# Patient Record
Sex: FEMALE | Race: WHITE | NOT HISPANIC OR LATINO | URBAN - METROPOLITAN AREA
[De-identification: names, ages, dates, MRNs, and addresses within clinical notes are randomized per-mention and may not be internally consistent; named-entity substitution may affect disease eponyms.]

---

## 2017-03-09 ENCOUNTER — OFFICE VISIT - HEALTHEAST (OUTPATIENT)
Dept: FAMILY MEDICINE | Facility: CLINIC | Age: 37
End: 2017-03-09

## 2017-03-09 DIAGNOSIS — J02.9 SORE THROAT: ICD-10-CM

## 2017-03-09 DIAGNOSIS — J02.9 PHARYNGITIS: ICD-10-CM

## 2017-03-09 ASSESSMENT — MIFFLIN-ST. JEOR: SCORE: 1405.49

## 2017-03-10 ENCOUNTER — COMMUNICATION - HEALTHEAST (OUTPATIENT)
Dept: FAMILY MEDICINE | Facility: CLINIC | Age: 37
End: 2017-03-10

## 2017-04-26 ENCOUNTER — COMMUNICATION - HEALTHEAST (OUTPATIENT)
Dept: SCHEDULING | Facility: CLINIC | Age: 37
End: 2017-04-26

## 2018-02-25 ENCOUNTER — COMMUNICATION - HEALTHEAST (OUTPATIENT)
Dept: SCHEDULING | Facility: CLINIC | Age: 38
End: 2018-02-25

## 2018-03-23 ENCOUNTER — OFFICE VISIT - HEALTHEAST (OUTPATIENT)
Dept: FAMILY MEDICINE | Facility: CLINIC | Age: 38
End: 2018-03-23

## 2018-03-23 DIAGNOSIS — L30.9 DERMATITIS: ICD-10-CM

## 2018-03-23 DIAGNOSIS — Z97.3 WEARS CONTACT LENSES: ICD-10-CM

## 2018-03-23 DIAGNOSIS — H10.13 ALLERGIC CONJUNCTIVITIS OF BOTH EYES: ICD-10-CM

## 2018-05-23 ENCOUNTER — OFFICE VISIT - HEALTHEAST (OUTPATIENT)
Dept: FAMILY MEDICINE | Facility: CLINIC | Age: 38
End: 2018-05-23

## 2018-05-23 DIAGNOSIS — H57.89 IRRITATION OF BOTH EYES: ICD-10-CM

## 2018-05-23 DIAGNOSIS — J30.2 SEASONAL ALLERGIES: ICD-10-CM

## 2018-05-23 DIAGNOSIS — R21 FACIAL RASH: ICD-10-CM

## 2018-05-23 DIAGNOSIS — L30.9 ECZEMA: ICD-10-CM

## 2018-05-23 LAB
C REACTIVE PROTEIN LHE: <0.1 MG/DL (ref 0–0.8)
ERYTHROCYTE [SEDIMENTATION RATE] IN BLOOD BY WESTERGREN METHOD: 5 MM/HR (ref 0–20)

## 2018-05-23 RX ORDER — TRIAMCINOLONE ACETONIDE 1 MG/G
CREAM TOPICAL
Qty: 30 G | Refills: 0 | Status: SHIPPED | OUTPATIENT
Start: 2018-05-23

## 2018-05-23 RX ORDER — OLOPATADINE HYDROCHLORIDE 1 MG/ML
1 SOLUTION/ DROPS OPHTHALMIC 2 TIMES DAILY
Qty: 5 ML | Refills: 1 | Status: SHIPPED | OUTPATIENT
Start: 2018-05-23

## 2018-05-23 ASSESSMENT — MIFFLIN-ST. JEOR: SCORE: 1379.13

## 2018-05-24 LAB — ANA SER QL: 2.4 U

## 2018-05-29 LAB
DNA (DS) ANTIBODY - HISTORICAL: 17 IU
SM (SMITH AUTOANTIBODIES - HISTORICAL: 2 EU

## 2018-05-31 ENCOUNTER — OFFICE VISIT - HEALTHEAST (OUTPATIENT)
Dept: ALLERGY | Facility: CLINIC | Age: 38
End: 2018-05-31

## 2018-05-31 DIAGNOSIS — L30.9 DERMATITIS: ICD-10-CM

## 2018-05-31 RX ORDER — TACROLIMUS 1 MG/G
OINTMENT TOPICAL
Qty: 30 G | Refills: 1 | Status: SHIPPED | OUTPATIENT
Start: 2018-05-31

## 2018-05-31 RX ORDER — CETIRIZINE HYDROCHLORIDE 10 MG/1
10 TABLET ORAL DAILY
Qty: 60 TABLET | Refills: 2 | Status: SHIPPED | OUTPATIENT
Start: 2018-05-31

## 2018-05-31 ASSESSMENT — MIFFLIN-ST. JEOR: SCORE: 1377.15

## 2018-06-07 ENCOUNTER — OFFICE VISIT - HEALTHEAST (OUTPATIENT)
Dept: FAMILY MEDICINE | Facility: CLINIC | Age: 38
End: 2018-06-07

## 2018-06-07 DIAGNOSIS — Z48.02 VISIT FOR SUTURE REMOVAL: ICD-10-CM

## 2018-06-07 DIAGNOSIS — S61.211A LACERATION OF LEFT INDEX FINGER: ICD-10-CM

## 2018-06-07 ASSESSMENT — MIFFLIN-ST. JEOR: SCORE: 1388.2

## 2019-05-01 ENCOUNTER — COMMUNICATION - HEALTHEAST (OUTPATIENT)
Dept: FAMILY MEDICINE | Facility: CLINIC | Age: 39
End: 2019-05-01

## 2021-05-28 NOTE — TELEPHONE ENCOUNTER
Reason contacted:  To help the Pt schedule a PHY  Information relayed:  Pt states they have moved out of state and is no longer coming to [facility] for care. Dr. Leah Salcedo is removed off of Pt's PCP    Additional questions:  No  Further follow-up needed:  No  Okay to leave a detailed message:  Yes

## 2021-05-28 NOTE — TELEPHONE ENCOUNTER
Left message to call back for: Keri     Information to relay to patient: Please help pt schedule physical with PAP.

## 2021-05-30 VITALS — BODY MASS INDEX: 25.39 KG/M2 | WEIGHT: 158 LBS | HEIGHT: 66 IN

## 2021-06-01 VITALS — WEIGHT: 152.44 LBS | BODY MASS INDEX: 24.5 KG/M2 | HEIGHT: 66 IN

## 2021-06-01 VITALS — BODY MASS INDEX: 25.73 KG/M2 | WEIGHT: 157 LBS

## 2021-06-01 VITALS — HEIGHT: 66 IN | WEIGHT: 150 LBS | BODY MASS INDEX: 24.11 KG/M2

## 2021-06-01 VITALS — WEIGHT: 150.44 LBS | BODY MASS INDEX: 24.18 KG/M2 | HEIGHT: 66 IN

## 2021-06-09 NOTE — PROGRESS NOTES
Subjective:      Keri Castellanos is a 36 y.o. female who presents today for sore throat and general malaise for 1 day.  She said she was fine at work yesterday and started feeling sick last evening with sore throat and tiredness.  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  Also states she felt pressure in her right ear; denies pain and popping noises.  She has not taken anything OTC to help symptoms.  She has not found that anything makes the symptoms worse.  Denies fever, chills, nausea, vomiting, diarrhea.  Denies coughing, shortness of breath, wheezing, chest pain  Denies sinus pressure/pain    Reviewed past medical/surgical history, family history, social history, medications and updated chart below.     No Known Allergies  History reviewed. No pertinent past medical history.  History reviewed. No pertinent surgical history.  Social History     Social History     Marital status:      Spouse name: N/A     Number of children: N/A     Years of education: N/A     Occupational History     Not on file.     Social History Main Topics     Smoking status: Former Smoker     Smokeless tobacco: Not on file     Alcohol use Yes     Drug use: No     Sexual activity: Not on file     Other Topics Concern     Not on file     Social History Narrative     Family History   Problem Relation Age of Onset     Multiple sclerosis Mother      Lymphoma Father      non-Hodgkin's     Current Outpatient Prescriptions   Medication Sig Dispense Refill     amoxicillin (AMOXIL) 500 MG tablet Take 500 mg by mouth 3 (three) times a day. Take until gone.       cholecalciferol, vitamin D3, (VITAMIN D3) 5,000 unit Tab Take 1 tablet by mouth daily.       triamcinolone (KENALOG) 0.1 % cream Apply topically 2 (two) times a day. APPLY AND RUB IN A THIN FILM TO AFFECTED AREAS TWICE DAILY.(AM AND PM).       No current facility-administered medications for this visit.   "    Patient Active Problem List    Diagnosis Date Noted     Gynecologic Services Intrauterine Device (IUD) Insertion      Allergic Rhinitis      De Quervain's Tenosynovitis      Normal Routine History And Physical - Postpartum      Post-term Pregnancy      Insufficient Uterine Enlargement For Dates Of Pregnancy        Review of Systems   Constitutional: Negative.   HENT: Sore throat.   Eyes: Negative.   Respiratory: Negative.   Cardiovascular: Negative.   Gastrointestinal: Negative.   Endocrine: Negative.   Genitourinary: Negative.   Musculoskeletal: Negative.   Skin: Negative.   Allergic/Immunologic: Negative.   Neurological: Negative.   Hematological: Negative.   Psychiatric/Behavioral: Negative.     Objective:    Physical Exam   Visit Vitals     /70 (Patient Site: Left Arm, Patient Position: Sitting, Cuff Size: Adult Regular)     Pulse 78     Temp 97.9  F (36.6  C) (Oral)     Ht 5' 5.5\" (1.664 m)     Wt 158 lb (71.7 kg)     SpO2 98%     BMI 25.89 kg/m2       Constitutional: Alert and oriented x3, well nourished without any acute distress  HENT:   Right Ear: External ear normal.   Left Ear: External ear normal.   Nose: Nose normal.  Right turbinate erythematous  Mouth/Throat: Oropharynx is clear and moist.   Eyes: Conjunctivae and EOM are normal. Pupils are equal, round, and reactive to light. Right eye exhibits no discharge. Left eye exhibits no discharge.   Lymphadenopathy: Without palpable lymphadenopathy  Cardiovascular: Normal S1 and S2. Regular rate, rhythm and no murmur, rubs or gallops. Palpable distal pulses bilaterally.  Pulmonary/Chest: Normal effort. Lungs clear to auscultation in all lobes. Without wheezing, rhonchi or crackles.    Psychiatric: Normal mood and affect.         Assessment/Plan:    1. Pharyngitis  Afebrile-97.9 and oxygen saturations-98% on room air, LCTA  Rapid strep and influenza testing done today, negative. Symptoms for one day, I do suspect viral in nature.   Discussed home " supportive care and recommended rest, fluids/ warm fluids, humidification, saline nasal spray, throat lozenges, gargle with warm salt water.    Reviewed red flags that would warrant urgent evaluation. Patient verbalized understanding.  Discussed with patient to follow up if worsening symptoms, questions, or concerns.  Patient agreed and appeared pleased with plan.       - Rapid Strep A Screen-Throat  - Influenza A/B Rapid Test  - Group A Strep, RNA Direct Detection, Throat      Recent Results (from the past 24 hour(s))   Rapid Strep A Screen-Throat   Result Value Ref Range    Rapid Strep A Antigen No Group A Strep detected No Group A Strep detected   Influenza A/B Rapid Test   Result Value Ref Range    Influenza  A, Rapid Antigen No Influenza A antigen detected No Influenza A antigen detected    Influenza B, Rapid Antigen No Influenza B antigen detected No Influenza B antigen detected     Addendum:  I have seen and examined Keri KULDEEP Flanaganro with student Dulce Marr. I agree with the above note. I was present during the entirety of the office visit, completed the physical exam and charting with Dulce.     Lorena Culver, MARIAJOSE  3/9/2017

## 2021-06-16 PROBLEM — L30.9 ECZEMA: Status: ACTIVE | Noted: 2018-05-23

## 2021-06-16 NOTE — PROGRESS NOTES
"Subjective:      Patient ID: Keri Rajan is a 37 y.o. female.    Chief Complaint:    HPI  Keri Rajan is a 37 y.o. female who presents today complaining of return of her eye problem.  It is a contact lens wear.  She has had a long-standing history of irritation to her contacts.  He only wears her contacts intermittently and she has had 2 separate bouts of having irritation from her contacts.  She discontinue the contact lens wear and then it did resolve.  This time she started wearing her contact lenses 2 days ago.  Over the last day she said that a \"flareup\" started with causing trouble in the bilateral eyes.  She removed the contacts still having irritation and redness on the left eye but also is having difficulty with irritation, dry skin and reactivity with redness around the inferior orbit of the left and right eye as well as on the superior orbit of the right eye.  This time she has no visual disturbances signs or symptoms of corneal abrasion or irritation or foreign body into the eyes. However, they have been itchy    She did try 1 dose of Benadryl 25 mg over-the-counter yesterday without relief.  Says normally spontaneously resolved for the patient in the past 3 episodes that she has had of this.    She has not went to go see her ophthalmologist for reevaluation of the problem with contact lenses.    No past medical history on file.    No past surgical history on file.    Family History   Problem Relation Age of Onset     Multiple sclerosis Mother      Lymphoma Father      non-Hodgkin's       Social History   Substance Use Topics     Smoking status: Former Smoker     Smokeless tobacco: Never Used     Alcohol use Yes       Review of Systems  As above in HPI otherwise negative  Objective:     /60  Pulse 64  Temp 98.5  F (36.9  C) (Oral)   Resp 14  Wt 157 lb (71.2 kg)  SpO2 100%  Breastfeeding? No  BMI 25.73 kg/m2    Physical Exam  General: Patient is resting " comfortably no acute distress is afebrile  HEENT: Head is normocephalic atraumatic eyes are PERRL EOMI sclera anicteric   Left cornea is inspected and there is no abrasion.  Right cornea is also inspected there is no abrasion.  Both sclera are quiet.  conjunctiva with chemosis bilaterally with the left more involved than the right.  Under the left eye there is an area of erythema that is confluent is not edematous at this time.  Is in the distribution as described above in HPI under the left and right bilateral eyes and over the right superior orbit on the right eye.  TMs are clear bilaterally  Throat is with mild pharyngeal wall erythema and mild exudate  No cervical lymphadenopathy present  Lungs: Clear to auscultation bilaterally  Heart: Regular rate and rhythm  Skin: Without rash non-diaphoretic      Assessment:     Procedures    1. Dermatitis  cetirizine (ZYRTEC) 10 MG tablet    ketotifen (ZADITOR) 0.025 % (0.035 %) ophthalmic solution    hydrocortisone 2.5 % cream    Ambulatory referral to Dermatology   2. Wears contact lenses     3. Allergic conjunctivitis of both eyes         Plan:     1. Dermatitis  cetirizine (ZYRTEC) 10 MG tablet    ketotifen (ZADITOR) 0.025 % (0.035 %) ophthalmic solution    hydrocortisone 2.5 % cream    Ambulatory referral to Dermatology   2. Wears contact lenses     3. Allergic conjunctivitis of both eyes           The skin area appears to be dry and scaly this does appear to be a contact type dermatitis.  Use a eyedrops that we are able to use with Zaditor to help combat the chemosis.  Additionally she use oral antihistamine.  Suggestion is to have her follow-up with ophthalmology for definitive evaluation and treatment, continue with contact lens rest and use her glasses as needed.  Lastly, referral to dermatology for consultation if she does not get full resolution or tach lens wear advice from her ophthalmologist.    Patient Instructions     Discontinue contact lens  wear.  Follow-up with ophthalmologist for evaluation of the eyes.  Use Zaditor topically and Zyrtec for itch and rash  Apply the topical hydrocortisone cream below the eye but take care not to put into the eye or on the upper eyelid.  Referral to dermatology is also written.    As a result of our visit today, here are the action plans for you:    1. Medication(s) to stop: There are no discontinued medications.    2. Medication(s) to start or change:   Medications Ordered   Medications     cetirizine (ZYRTEC) 10 MG tablet     Sig: Take 1 tablet (10 mg total) by mouth daily.     Dispense:  30 tablet     Refill:  2     hydrocortisone 2.5 % cream     Sig: Apply topically 2 (two) times a day for 7 days. Apply below eyes and around the orbit but not on the upper eyelid itself     Dispense:  3.5 g     Refill:  0     ketotifen (ZADITOR) 0.025 % (0.035 %) ophthalmic solution     Sig: Administer 1 drop to both eyes 2 (two) times a day for 10 days.     Dispense:  10 mL     Refill:  0       3. Other instructions: Yes     Referral to dermatology as written and also follow-up with ophthalmologist for evaluation and definitive treatment.

## 2021-06-18 NOTE — PROGRESS NOTES
Assessment:    New eczematous rash around eyes and mouth.  Consider possible allergic contact component    Plan:    Recommend topical tacrolimus.  If not covered hydrocortisone 1% can be used twice daily.  Daily moisturizing  Pay attention to possible contact triggers.  ____________________________________________________________________________     Patient is here today for new rash around her eyes.  This started first in December.  She describes as itchy.  Initially her eyes appear red.  Then the skin around her eyes becomes inflamed rough and darkened.  Is been persistent since December but coming and going in severity.  She does report a significant flare in the month of March.  That time she went in and was referred to dermatology.  It was felt that she had a secondary infection.  They did do treatment with topical cream and moisturizers.  She has been using Cetaphil and Aquaphor since then.  She is tried hydrocortisone with flares up.  She does have a history of eczema on her hands for years.  She reports no change in her makeup.  At the time of onset she was remodeling a home.  Currently she is packing and getting ready for moving.  This includes cleaning with products.  She is not using any gloves.  She will be moving to [place] this summer.  She does have history of environmental allergies.  She reports nasal congestion itchy eyes and sneezing however there appears to be no association with the symptoms and her rash.  She has had no change in the workplace.  In October 2017 she was having headaches and they were having a strange odor at work however that was reportedly fixed.    Review of symptoms:  As above, otherwise negative    Past medical history: No other chronic medical conditions noted.    Allergies: No known allergies to medications, latex, foods or hymenoptera venom    Family history: No known member of the family with allergy or asthma.    Social history: Currently lives in house is built in  1952.  She has been in this house for 6 years.  It has forced air heat and central air conditioning.  There is a basement present.  She has a dog in the home.  She has had the same job for 8 years.  No current cigarette smoking.  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    Medications: reviewed in chart    Physical Exam:  General:  Alert and in no apparent distress.  Eyes:  Sclera clear.  Ears: TMs translucent grey with bony landmarks visible. Nose: Pale, boggy mucosal membranes.  Throat: Pink, moist.  No lesions.  Neck: Supple.  No lymphadenopathy.  Lungs: CTA.  CV: Regular rate and rhythm. Extremities: Well perfused.  No clubbing or cyanosis. Skin: Dry rough eczematous rash around both eyes.

## 2021-06-18 NOTE — PROGRESS NOTES
"  Assessment/Plan:         1. Laceration of left index finger     2. Visit for suture removal       7 sutures were removed from left index finger.  Wound edges were approximated well no further management was required of the laceration.  Recommend keeping area clean and applying triple antibiotic ointment to help with prevention of infection as well as improving skin elasticity.  Follow-up if no improvement or symptoms are worsening or if there is any present of infection.  Patient is in agreement this plan.    Subjective:      Keri Rajan is a 37 y.o. female who presents for suture removal. Sutures were placed at [facility] on 5/26/18. Injury to finger occurred when fixing a blade on a immersion . Healing has been going well.  Mild pain is present at the tip of her finger where a suture is in place. One suture had fallen out. Otherwise no concerns.     The following portions of the patient's history were reviewed and updated as appropriate: allergies, current medications and problem list.    Review of Systems   Pertinent items are noted in HPI.      Objective:      /70 (Patient Site: Right Arm, Patient Position: Sitting, Cuff Size: Adult Regular)  Pulse 64  Temp 98.2  F (36.8  C) (Oral)   Resp 16  Ht 5' 6\" (1.676 m)  Wt 152 lb 7 oz (69.1 kg)  LMP 05/16/2018 (Approximate)  BMI 24.6 kg/m2    General appearance: alert, appears stated age and cooperative  Skin: 7 sutures identified on left index finger at the DIP as well as the space between the DIP and MIP joint.  Wound edges are approximated well.  7 sutures were successfully removed with suture removal scissors and tweezers.  Patient tolerated procedure well and was without complaint.  No additional treatment of the wound was required following suture removal.    "

## 2021-06-18 NOTE — PROGRESS NOTES
"Assessment / Impression     1. Facial rash  Ambulatory referral to Allergy    Antinuclear Antibody ([facility]) [place]    C-Reactive Protein    Erythrocyte Sedimentation Rate    DNA (ds) Antibody Screen    Anti-Carlton Antibody   2. Irritation of both eyes     3. Seasonal allergies  Ambulatory referral to Allergy   4. Eczema           Plan:     The underlying cause of her symptoms is still not clear, but it seems likely that allergies may be contributing to this.  I recommended she continue the cetirizine and she was given a prescription for Patanol eyedrops.  She was also given a referral to the allergy clinic.  I recommended that we check the above labs to help rule out an autoimmune cause such as lupus.  She has already established care with dermatology and may follow-up with them as well.  She is planning to see the allergist first.  She is given a refill of triamcinolone cream which she uses for eczema which primarily affects the hands.    Subjective:      HPI: Keri Rajan is a 37 y.o. female who presents to the clinic to discuss an ongoing rash around her eyes that she has had since March.  She reports having itchy eyes that are occasionally watery.  She has a history of seasonal allergies and is wondering if this may be part of the problem.  She denies using any new soaps or detergents.  She denies significant fatigue or myalgias.  She has had some right-sided shoulder pain and she reports \"I tweaked my neck\" a few weeks ago.  Ice and Motrin have been helpful for this.  She has been under more stress as they are moving to [place] in July.  She states that the eyes often feel irritated.  She is seen in the walk-in clinic on 3/23 and they recommended cetirizine, hydrocortisone cream she was given a prescription for an antihistamine eyedrop which she never used.  She is also referred to dermatology and ophthalmology.  She states that the dermatologist gave her an antibiotic cream which seemed to help.  The " "optometrist she saw gave her eyedrops which also provided some benefit.  Unfortunately, she cannot remember what the names were of these products.  Is a history of eczema which primarily affects the hands.  She needs a refill of triamcinolone.        Review of Systems  All other systems reviewed and are negative.     History   Smoking Status     Former Smoker   Smokeless Tobacco     Never Used       Family History   Problem Relation Age of Onset     Multiple sclerosis Mother      Lymphoma Father      non-Hodgkin's       Objective:     /72 (Patient Site: Left Arm, Patient Position: Sitting, Cuff Size: Adult Regular)  Pulse (!) 56  Temp 98.5  F (36.9  C) (Oral)   Resp 16  Ht 5' 6\" (1.676 m)  Wt 150 lb 7 oz (68.2 kg)  LMP 05/16/2018 (Approximate)  Breastfeeding? No  BMI 24.28 kg/m2  Physical Examination: General appearance - alert, well appearing, and in no distress  Eyes: pupils equal and reactive, extraocular eye movements intact.  Slightly increased tearing in the eyes.  No purulent discharge.  Mouth: mucous membranes moist, pharynx normal without lesions  Neck: supple, no significant adenopathy  Lungs: clear to auscultation, no wheezes, rales or rhonchi, symmetric air entry  Heart: normal rate, regular rhythm, normal S1, S2, no murmurs, rubs, clicks or gallops  Neurological: alert, oriented, normal speech, no focal findings or movement disorder noted.    Extremities: No edema, no clubbing or cyanosis  Psychiatric: Normal affect. Does not appear anxious or depressed.  Skin: There is erythema across both cheeks and around her eyes/forehead.  There is a dry patchy scaly skin on her right hand.  No results found for this or any previous visit (from the past 168 hour(s)).    Current Outpatient Prescriptions   Medication Sig     cetirizine (ZYRTEC) 10 MG tablet Take 1 tablet (10 mg total) by mouth daily.     cholecalciferol, vitamin D3, (VITAMIN D3) 5,000 unit Tab Take 1 tablet by mouth daily.     " olopatadine (PATANOL) 0.1 % ophthalmic solution Administer 1 drop to both eyes 2 (two) times a day.     triamcinolone (KENALOG) 0.1 % cream APPLY AND RUB IN A THIN FILM TO AFFECTED AREAS TWICE DAILY.(AM AND PM).

## 2021-06-27 ENCOUNTER — HEALTH MAINTENANCE LETTER (OUTPATIENT)
Age: 41
End: 2021-06-27

## 2021-10-16 ENCOUNTER — HEALTH MAINTENANCE LETTER (OUTPATIENT)
Age: 41
End: 2021-10-16

## 2022-07-23 ENCOUNTER — HEALTH MAINTENANCE LETTER (OUTPATIENT)
Age: 42
End: 2022-07-23

## 2022-10-01 ENCOUNTER — HEALTH MAINTENANCE LETTER (OUTPATIENT)
Age: 42
End: 2022-10-01

## 2023-08-06 ENCOUNTER — HEALTH MAINTENANCE LETTER (OUTPATIENT)
Age: 43
End: 2023-08-06

## 2024-03-03 ENCOUNTER — HEALTH MAINTENANCE LETTER (OUTPATIENT)
Age: 44
End: 2024-03-03